# Patient Record
Sex: FEMALE | Race: WHITE | NOT HISPANIC OR LATINO | URBAN - METROPOLITAN AREA
[De-identification: names, ages, dates, MRNs, and addresses within clinical notes are randomized per-mention and may not be internally consistent; named-entity substitution may affect disease eponyms.]

---

## 2017-05-17 ENCOUNTER — OFFICE VISIT (OUTPATIENT)
Dept: OPTOMETRY | Facility: CLINIC | Age: 67
End: 2017-05-17

## 2017-05-17 DIAGNOSIS — H52.4 MYOPIA WITH ASTIGMATISM AND PRESBYOPIA, BILATERAL: ICD-10-CM

## 2017-05-17 DIAGNOSIS — H52.203 MYOPIA WITH ASTIGMATISM AND PRESBYOPIA, BILATERAL: ICD-10-CM

## 2017-05-17 DIAGNOSIS — H52.13 MYOPIA WITH ASTIGMATISM AND PRESBYOPIA, BILATERAL: ICD-10-CM

## 2017-05-17 DIAGNOSIS — H50.00 MONOCULAR ESOTROPIA: ICD-10-CM

## 2017-05-17 DIAGNOSIS — H55.01 CONGENITAL NYSTAGMUS: ICD-10-CM

## 2017-05-17 DIAGNOSIS — H25.13 NUCLEAR SENILE CATARACT, BILATERAL: ICD-10-CM

## 2017-05-17 DIAGNOSIS — E70.319 OCULAR ALBINISM (H): Primary | ICD-10-CM

## 2017-05-17 ASSESSMENT — TONOMETRY
IOP_METHOD: APPLANATION
OS_IOP_MMHG: 17
OD_IOP_MMHG: 17

## 2017-05-17 ASSESSMENT — REFRACTION_MANIFEST
OS_SPHERE: -2.00
OD_AXIS: 075
OD_SPHERE: -3.00
OS_CYLINDER: +0.75
OS_AXIS: 085
OD_CYLINDER: +0.75

## 2017-05-17 ASSESSMENT — REFRACTION_CURRENTRX
OD_CYLINDER: -0.75
OS_AXIS: 180
OD_AXIS: 180
OS_SPHERE: -1.50
OS_DIAMETER: 14.5
OD_SPHERE: -2.00
OD_AXIS: 170
OS_CYLINDER: -0.75
OD_DIAMETER: 14.5
OD_BASECURVE: 8.6
OS_BASECURVE: 8.6
OS_DIAMETER: 14.5
OS_BRAND: ACUVUE OASYS FOR ASTIGMATISM
OD_BRAND: ACUVUE OASYS FOR ASTIGMATISM
OS_BASECURVE: 8.6
OD_BASECURVE: 8.6
OS_CYLINDER: -0.75
OS_BRAND: ACUVUE OASYS FOR ASTIGMATISM
OD_CYLINDER: -0.75
OS_AXIS: 180
OS_SPHERE: -1.50
OD_DIAMETER: 14.5
OD_BRAND: ACUVUE OASYS FOR ASTIGMATISM
OD_SPHERE: -1.75

## 2017-05-17 ASSESSMENT — VISUAL ACUITY
OS_CC: 20/30-2
METHOD: SNELLEN - LINEAR
OD_CC: 20/40-2
CORRECTION_TYPE: CONTACTS

## 2017-05-17 ASSESSMENT — REFRACTION_WEARINGRX
OS_AXIS: 85
OS_AXIS: 085
OD_SPHERE: -3.00
OD_SPHERE: -2.75
OS_SPHERE: -2.00
OS_SPHERE: -2.00
OS_CYLINDER: +1.00
OS_ADD: +2.75
OS_CYLINDER: +0.75
OD_CYLINDER: +0.25
OS_ADD: +2.75
OD_ADD: +2.75
OD_AXIS: 074
OD_CYLINDER: +0.25
OD_ADD: +2.75
OD_AXIS: 74

## 2017-05-17 ASSESSMENT — SLIT LAMP EXAM - LIDS
COMMENTS: NORMAL
COMMENTS: NORMAL

## 2017-05-17 ASSESSMENT — CUP TO DISC RATIO
OD_RATIO: 0.4
OS_RATIO: 0.4

## 2017-05-17 ASSESSMENT — CONF VISUAL FIELD
OD_NORMAL: 1
OS_NORMAL: 1

## 2017-05-17 NOTE — MR AVS SNAPSHOT
After Visit Summary   5/17/2017    Ml Cagle    MRN: [id number]           Patient Information     Date Of Birth          1950        Visit Information        Provider Department      5/17/2017 4:00 PM Talia Isaac, [facility]        Today's Diagnoses     Ocular albinism (H)    -  1    Congenital nystagmus        Monocular esotropia        Nuclear senile cataract, bilateral        Myopia with astigmatism and presbyopia, bilateral           Follow-ups after your visit        Who to contact     Please call your clinic at [phone] to:    Ask questions about your health    Make or cancel appointments    Discuss your medicines    Learn about your test results    Speak to your doctor   If you have compliments or concerns about an experience at your clinic, or if you wish to file a complaint, please contact [facility] Patient Relations at [phone] or email us at [email or web address]         Additional Information About Your Visit        MyChart Information     Readbugt gives you secure access to your electronic health record. If you see a primary care provider, you can also send messages to your care team and make appointments. If you have questions, please call your primary care clinic.  If you do not have a primary care provider, please call [phone] and they will assist you.      CareParent is an electronic gateway that provides easy, online access to your medical records. With CareParent, you can request a clinic appointment, read your test results, renew a prescription or communicate with your care team.     To access your existing account, please contact your [facility] or call [phone] for assistance.        Care EveryWhere ID     This is your Care EveryWhere ID. This could be used by other organizations to access your [facility] medical records  [id number]         Blood Pressure from Last 3 Encounters:    03/04/14 99/61   07/02/13 98/68   02/05/13 101/60    Weight from Last 3 Encounters:   No data found for Wt              We Performed the Following     HC CONTACT LENS FITTING COSMETIC LVL 1 (94402.011)     REFRACTION [59219]          Today's Medication Changes          These changes are accurate as of: 5/17/17 11:59 PM.  If you have any questions, ask your nurse or doctor.               Stop taking these medicines if you haven't already. Please contact your care team if you have questions.     fluorouracil 5 % cream   Commonly known as:  EFUDEX   Stopped by:  Talia Isaac OD                    Primary Care Provider    Physician No Ref-Primary       No address on file        Thank you!     Thank you for choosing [facility]IN[facility]  for your care. Our goal is always to provide you with excellent care. Hearing back from our patients is one way we can continue to improve our services. Please take a few minutes to complete the written survey that you may receive in the mail after your visit with us. Thank you!             Your Updated Medication List - Protect others around you: Learn how to safely use, store and throw away your medicines at www.disposemymeds.org.          This list is accurate as of: 5/17/17 11:59 PM.  Always use your most recent med list.                   Brand Name Dispense Instructions for use    calcium 1500 MG Tabs      Take  by mouth.       CVS VIT D 5000 HIGH-POTENCY PO      Take  by mouth.       HERBALS          MULTI-VITAMIN DAILY PO      Take  by mouth.

## 2017-05-22 ASSESSMENT — VISUAL ACUITY: METHOD_MR: TRIAL FRAME REFRACTION

## 2017-05-22 NOTE — PROGRESS NOTES
A/P  1.) Ocular albinism OU  -Iris transillumination on exam   -BCVA 20/30 both eyes  -Stable OU    2.) Nystagmus  -Stable, monitor    3.) Esotropia, right eye  -H/o strab surgery right eye as a child  -She is concerned this is worsening  -No diplopia, but increased eyestrain towards end of the day  -Discussed options, she is interested in strab referral    4.) Cataracts OU  -Not visually significant at this time  -Continue to monitor    5.) Myopia/Astig/Presbyopia  -Updated CLRx and spec Rx  -Monitor     Refer to Dr. Lugo for strab eval, otherwise 1 year routine

## 2017-06-06 DIAGNOSIS — H53.2 DOUBLE VISION: Primary | ICD-10-CM

## 2017-06-07 ENCOUNTER — OFFICE VISIT (OUTPATIENT)
Dept: OPHTHALMOLOGY | Facility: CLINIC | Age: 67
End: 2017-06-07
Attending: OPHTHALMOLOGY
Payer: COMMERCIAL

## 2017-06-07 DIAGNOSIS — H50.05 ESOTROPIA, ALTERNATING: Primary | ICD-10-CM

## 2017-06-07 DIAGNOSIS — H53.2 DOUBLE VISION: ICD-10-CM

## 2017-06-07 PROCEDURE — 99215 OFFICE O/P EST HI 40 MIN: CPT | Mod: 25,ZF

## 2017-06-07 PROCEDURE — 92060 SENSORIMOTOR EXAMINATION: CPT | Mod: ZF | Performed by: OPHTHALMOLOGY

## 2017-06-07 ASSESSMENT — REFRACTION_WEARINGRX
OS_ADD: +2.75
OS_CYLINDER: +0.75
OD_CYLINDER: +0.25
OD_AXIS: 074
OS_AXIS: 85
OD_SPHERE: -2.75
OS_ADD: +2.75
OS_AXIS: 085
OD_SPHERE: -3.00
OS_SPHERE: -2.00
OD_AXIS: 74
OD_ADD: +2.75
OD_CYLINDER: +0.25
OS_SPHERE: -2.00
OD_ADD: +2.75
OS_CYLINDER: +1.00

## 2017-06-07 ASSESSMENT — VISUAL ACUITY
OD_CC: 20/40
OD_CC+: +2
CORRECTION_TYPE: GLASSES
OS_CC: 20/30
METHOD: SNELLEN - LINEAR
OS_CC+: +1

## 2017-06-07 ASSESSMENT — CONF VISUAL FIELD
OS_NORMAL: 1
OD_NORMAL: 1

## 2017-06-07 ASSESSMENT — SLIT LAMP EXAM - LIDS
COMMENTS: NORMAL
COMMENTS: NORMAL

## 2017-06-07 ASSESSMENT — TONOMETRY
OD_IOP_MMHG: 14
IOP_METHOD: TONOPEN
OS_IOP_MMHG: 12

## 2017-06-07 ASSESSMENT — CUP TO DISC RATIO
OS_RATIO: 0.4
OD_RATIO: 0.4

## 2017-06-07 ASSESSMENT — EXTERNAL EXAM - RIGHT EYE: OD_EXAM: NORMAL

## 2017-06-07 ASSESSMENT — EXTERNAL EXAM - LEFT EYE: OS_EXAM: LUL PTOSIS

## 2017-06-07 ASSESSMENT — MARGIN REFLEX DISTANCE
OD_MRD1: 4
OS_MRD1: 1-2

## 2017-06-07 NOTE — NURSING NOTE
Chief Complaints and History of Present Illnesses   Patient presents with     Neurologic Problem     strabismus     HPI    Symptoms:              Comments:  Ml is a 66 year old female with a history of:   1. Esotropia  - Feels that eyes are turning in more than in the past. She is unsure if she needs surgery, or if it is recommended.   - History of strabismus surgery at 4-5 years of age.   - Feels right eye turns in more than left.   - Born with deviation, per patient account.   - 3-6 siblings have both OCA and strabismus. Brother had strabismus surgery as well.     [employer] 8:06 AM June 7, 2017       2. Ocular albinism OU  3. Nystagmus  4. Cataracts OU  5. Myopia/Astig/Presbyopia: Glasses and contact lens prescription updated recently by Dr. Donnelly.

## 2017-06-07 NOTE — LETTER
2017    RE: Ml Cagle  : 1950  MRN: [id number]    Dear Dr. Isaac,    Thank you for referring your patient, Ml Cagle, to my neuro-ophthalmology clinic recently.  After a thorough neuro-ophthalmic history and examination, I came to the following conclusions:         1. Ocular albinism with congenital esotropia status post strabismus surgery at age 4 and prominent positive angle kappa leading to aesthetic appearance of small angle right exotropia at distance despite moderate angle right esotropia at distance on alternate cover testing:    - patient noticing occasional fatigue of eyes but given lack of binocularity I doubt this is strabismus related asthenopia usually reserved for patient's with fusion.  - Given aesthetically ( testing via Krimsky) her eyes appear virtually aligned, no indication for strabismus repair at this time.  - Positive angle kappa is common in ocular albinism patients (virtually all have it and this patient has a relative large positive angle kappa).    2. Left upper eyelid ptosis  - Appears levator dehiscence related  - gave patient card of Dr. Balderas in case she wishes to pursue eyelid surgery in the future.      3. Contact lens  - continue to follow-up with Dr. Isaac    4. Congenital nystagmus associated with ocular albinism  - small right head turn / small angle left gaze null point  - observe    - follow-up as needed with me         Again, thank you for trusting me with the care of your patient.  For further exam details, please feel free to contact our office for additional records.  If you wish to contact me regarding this patient please email me at [email or web address] or give my clinic a call to arrange a phone conversation.    Sincerely,    Bang Lugo MD  , Neuro-Ophthalmology and Adult Strabismus  Department of Ophthalmology and Visual Neurosciences  [facility]    DX: ocular albinism, positive angle kappa

## 2017-06-07 NOTE — PROGRESS NOTES
ASSESSMENT AND PLAN:         1. Ocular albinism with congenital esotropia status post strabismus surgery at age 4 and prominent positive angle kappa leading to aesthetic appearance of small angle right exotropia at distance despite moderate angle right esotropia at distance on alternate cover testing:    - patient noticing occasional fatigue of eyes but given lack of binocularity I doubt this is strabismus related asthenopia usually reserved for patient's with fusion.  - Given aesthetically ( testing via Krimsky) her eyes appear virtually aligned, no indication for strabismus repair at this time.  - Positive angle kappa is common in ocular albinism patients (virtually all have it and this patient has a relative large positive angle kappa).    2. Left upper eyelid ptosis  - Appears levator dehiscence related  - gave patient card of Dr. Balderas in case she wishes to pursue eyelid surgery in the future.      3. Contact lens  - continue to follow-up with Dr. Isaac    4. Congenital nystagmus associated with ocular albinism  - small right head turn / small angle left gaze null point  - observe    - follow-up as needed with me          Complete documentation of historical and exam elements from today's encounter can be found in the full encounter summary report (not reduplicated in this progress note).  I personally obtained the chief complaint(s) and history of present illness.  I confirmed and edited as necessary the review of systems, past medical/surgical history, family history, social history, and examination findings as documented by others; and I examined the patient myself.  I personally reviewed the relevant tests, images, and reports as documented above.  I formulated and edited as necessary the assessment and plan and discussed the findings and management plan with the patient and family   Bang Lugo MD

## 2017-06-07 NOTE — MR AVS SNAPSHOT
After Visit Summary   6/7/2017    Ml Cagle    MRN: [id number]           Patient Information     Date Of Birth          1950        Visit Information        Provider Department      6/7/2017 8:00 AM [facility]        Today's Diagnoses     Esotropia, alternating    -  1    Double vision           Follow-ups after your visit        Who to contact     Please call your clinic at [phone] to:    Ask questions about your health    Make or cancel appointments    Discuss your medicines    Learn about your test results    Speak to your doctor   If you have compliments or concerns about an experience at your clinic, or if you wish to file a complaint, please contact [facility] Patient Relations at [phone] or email us at [email or web address]         Additional Information About Your Visit        MyChart Information     The [employer] gives you secure access to your electronic health record. If you see a primary care provider, you can also send messages to your care team and make appointments. If you have questions, please call your primary care clinic.  If you do not have a primary care provider, please call [phone] and they will assist you.      Quick2LAUNCH is an electronic gateway that provides easy, online access to your medical records. With Quick2LAUNCH, you can request a clinic appointment, read your test results, renew a prescription or communicate with your care team.     To access your existing account, please contact your [facility] or call [phone] for assistance.        Care EveryWhere ID     This is your Care EveryWhere ID. This could be used by other organizations to access your [facility] medical records  [id number]         Blood Pressure from Last 3 Encounters:   03/04/14 99/61   07/02/13 98/68   02/05/13 101/60    Weight from Last 3 Encounters:   No data found for Wt              We  Performed the Following     IOP Measurement     Sensorimotor        Primary Care Provider    Physician No Ref-Primary       No address on file        Thank you!     Thank you for choosing [facility]  for your care. Our goal is always to provide you with excellent care. Hearing back from our patients is one way we can continue to improve our services. Please take a few minutes to complete the written survey that you may receive in the mail after your visit with us. Thank you!             Your Updated Medication List - Protect others around you: Learn how to safely use, store and throw away your medicines at www.disposemymeds.org.          This list is accurate as of: 6/7/17  9:09 AM.  Always use your most recent med list.                   Brand Name Dispense Instructions for use    ALEVE PO      Take by mouth as needed for inflammation       calcium 1500 MG Tabs      Take  by mouth.       CVS VIT D 5000 HIGH-POTENCY PO      Take  by mouth.       HERBALS          MULTI-VITAMIN DAILY PO      Take  by mouth.

## 2017-08-14 ENCOUNTER — ALLIED HEALTH/NURSE VISIT (OUTPATIENT)
Dept: NURSING | Facility: CLINIC | Age: 67
End: 2017-08-14
Payer: COMMERCIAL

## 2017-08-14 DIAGNOSIS — Z11.1 SCREENING EXAMINATION FOR PULMONARY TUBERCULOSIS: Primary | ICD-10-CM

## 2017-08-14 PROCEDURE — 86580 TB INTRADERMAL TEST: CPT

## 2017-08-14 PROCEDURE — 99207 ZZC NO CHARGE NURSE ONLY: CPT

## 2017-08-14 NOTE — MR AVS SNAPSHOT
After Visit Summary   8/14/2017    Ml Cagle    MRN: [id number]           Patient Information     Date Of Birth          1950        Visit Information        Provider Department      8/14/2017 11:00 AM HW MEDICAL ASSISTANT [facility]        Today's Diagnoses     Screening examination for pulmonary tuberculosis    -  1       Follow-ups after your visit        Your next 10 appointments already scheduled     Aug 16, 2017 11:30 AM CDT   LAB with HW RN/TRIAGE NURSE ONLY   [facility] ([facility]    [address] [place] [id number] [phone]           Patient must bring picture ID. Patient should be prepared to give a urine specimen  Please do not eat 10-12 hours before your appointment if you are coming in fasting for labs on lipids, cholesterol, or glucose (sugar). Pregnant women should follow their Care Team instructions. Water with medications is okay. Do not drink coffee or other fluids. If you have concerns about taking  your medications, please ask at office or if scheduling via [employer], send a message by clicking on Secure Messaging, Message Your Care Team.              Who to contact     If you have questions or need follow up information about today's clinic visit or your schedule please contact [facility] directly at [phone].  Normal or non-critical lab and imaging results will be communicated to you by SureVisithart, letter or phone within 4 business days after the clinic has received the results. If you do not hear from us within 7 days, please contact the clinic through [employer] or phone. If you have a critical or abnormal lab result, we will notify you by phone as soon as possible.  Submit refill requests through [employer] or call your pharmacy and they will forward the refill request to us. Please allow 3 business days for your refill to be completed.          Additional Information About Your Visit         Friendsurance Information     Friendsurance gives you secure access to your electronic health record. If you see a primary care provider, you can also send messages to your care team and make appointments. If you have questions, please call your primary care clinic.  If you do not have a primary care provider, please call [phone] and they will assist you.        Care EveryWhere ID     This is your Care EveryWhere ID. This could be used by other organizations to access your [place] medical records  [id number]         Blood Pressure from Last 3 Encounters:   03/04/14 99/61   07/02/13 98/68   02/05/13 101/60    Weight from Last 3 Encounters:   No data found for Wt              We Performed the Following     TB INTRADERMAL TEST        Primary Care Provider    Physician No Ref-Primary       No address on file        Equal Access to Services     ARACELI GEORGES : Hadii gurjit Rowe, waaxda luqadaha, qaybta kaalmada miguelina, alyce watkins . So [facility] [place] [phone].    ATENCIÓN: Si habla español, tiene a connors disposición servicios gratuitos de asistencia lingüística. Llame al [phone].    We comply with applicable federal civil rights laws and [place] laws. We do not discriminate on the basis of race, color, national origin, age, disability sex, sexual orientation or gender identity.            Thank you!     Thank you for choosing [facility]  for your care. Our goal is always to provide you with excellent care. Hearing back from our patients is one way we can continue to improve our services. Please take a few minutes to complete the written survey that you may receive in the mail after your visit with us. Thank you!             Your Updated Medication List - Protect others around you: Learn how to safely use, store and throw away your medicines at www.disposemymeds.org.          This list is accurate as of: 8/14/17 11:35 AM.  Always use your most recent med list.                    Brand Name Dispense Instructions for use Diagnosis    ALEVE PO      Take by mouth as needed for inflammation        calcium 1500 MG Tabs      Take  by mouth.        CVS VIT D 5000 HIGH-POTENCY PO      Take  by mouth.        HERBALS           MULTI-VITAMIN DAILY PO      Take  by mouth.

## 2017-08-16 DIAGNOSIS — Z11.1 SCREENING EXAMINATION FOR PULMONARY TUBERCULOSIS: Primary | ICD-10-CM

## 2017-08-16 LAB
PPDINDURATION: 0 MM (ref 0–5)
PPDREDNESS: 0 MM

## 2017-08-16 PROCEDURE — 99207 ZZC NO CHARGE NURSE ONLY: CPT

## 2017-08-16 NOTE — LETTER
[address] [id number] [phone]          8/16/2017          To Whom it May Concern:     Ml Cagle, female, 1950 has had a mantoux placed on 8/14/17.      Mantoux result is NEGATIVE:  Lab Results   Component Value Date    PPDREDNESS 0 08/16/2017    PPDINDURATIO 0 08/16/2017                 Sincerely,          Jaymie Shelton, JODYN, RN

## 2017-08-16 NOTE — NURSING NOTE
Patient walked into exam room.    Patient in clinic for mantoux reading.    Results negative.    Result letter completed and given to patient.  JODY CanadaN, RN

## 2018-02-22 ENCOUNTER — TELEPHONE (OUTPATIENT)
Dept: DERMATOLOGY | Facility: CLINIC | Age: 68
End: 2018-02-22

## 2018-03-01 ENCOUNTER — OFFICE VISIT (OUTPATIENT)
Dept: DERMATOLOGY | Facility: CLINIC | Age: 68
End: 2018-03-01
Payer: COMMERCIAL

## 2018-03-01 DIAGNOSIS — D48.5 NEOPLASM OF UNCERTAIN BEHAVIOR OF SKIN: Primary | ICD-10-CM

## 2018-03-01 DIAGNOSIS — L82.1 SK (SEBORRHEIC KERATOSIS): ICD-10-CM

## 2018-03-01 DIAGNOSIS — Z85.828 HISTORY OF NONMELANOMA SKIN CANCER: ICD-10-CM

## 2018-03-01 RX ORDER — LIDOCAINE HYDROCHLORIDE AND EPINEPHRINE 10; 10 MG/ML; UG/ML
3 INJECTION, SOLUTION INFILTRATION; PERINEURAL ONCE
Qty: 3 ML | Refills: 0 | OUTPATIENT
Start: 2018-03-01 | End: 2018-03-01

## 2018-03-01 ASSESSMENT — PAIN SCALES - GENERAL: PAINLEVEL: NO PAIN (1)

## 2018-03-01 NOTE — LETTER
3/1/2018       RE: Ml Cagle  [address]  [facility] [address]     Dear Colleague,    Thank you for referring your patient, Ml Cagle, to the [facility] DERMATOLOGY at [facility]. Please see a copy of my visit note below.    SUBJECTIVE:  Ml comes in for a general skin check.  She is a lady who lived for quite a while in [place] and had a lot of sun and as a result has had numerous skin cancers and has had Mohs surgery and other excisional  surgeries through the years.      She is healthy, but she comes in today for a complete skin check because of her past history.  She is concerned about a lesion on her upper chest and midline chest that has been present a few months.  It was seen apparently a few months ago and she was advised to simply watch this.  This has not gone away with the use of topical steroids, so this is of concern to her today.        OBJECTIVE:  On examination, we checked her face, neck, chest, back, arms, legs, breasts, back, buttocks.  We found on the upper chest the lesion of concern.  It was a 1 cm, irregularly surfaced, shiny lesion that suggested a basal cell carcinoma.  It could be a squamous cell.  It could be just an actinic keratosis.      She had numerous, very small seborrheic keratoses on her back, and as a courtesy, I treated a few of these with liquid nitrogen.      I saw no other atypical or abnormal moles on her face, especially around the central face.      ASSESSMENT:     1.  Probable basal cell, upper chest.     2.  A 1 cm seborrheic keratosis.     3.  History of numerous nonmelanoma skin cancers.        PLAN:  The lesion on the chest was anesthetized, shaved, removed and submitted.  We will call with the report.  If this is basal cell, we likely will refer her to  Dermatologic Surgery for further treatment.      MEDICATIONS:  Reviewed.        ALLERGIES:  Reviewed.      We will likely have Ml come back in 6 months.            Lidocaine 1-1:064489 % injection   1mL once for one use, starting 3/2/2018 ending 3/2/2018,  2mL disp, R-0, injection  Injected by Dr. Matthews        Pictures were placed in Pt's chart today for future reference.    Again, thank you for allowing me to participate in the care of your patient.      Sincerely,    JENN Matthews MD

## 2018-03-01 NOTE — PROGRESS NOTES
SUBJECTIVE:  Ml comes in for a general skin check.  She is a lady who lived for quite a while in [place] and had a lot of sun and as a result has had numerous skin cancers and has had Mohs surgery and other excisional  surgeries through the years.      She is healthy, but she comes in today for a complete skin check because of her past history.  She is concerned about a lesion on her upper chest and midline chest that has been present a few months.  It was seen apparently a few months ago and she was advised to simply watch this.  This has not gone away with the use of topical steroids, so this is of concern to her today.        OBJECTIVE:  On examination, we checked her face, neck, chest, back, arms, legs, breasts, back, buttocks.  We found on the upper chest the lesion of concern.  It was a 1 cm, irregularly surfaced, shiny lesion that suggested a basal cell carcinoma.  It could be a squamous cell.  It could be just an actinic keratosis.      She had numerous, very small seborrheic keratoses on her back, and as a courtesy, I treated a few of these with liquid nitrogen.      I saw no other atypical or abnormal moles on her face, especially around the central face.      ASSESSMENT:     1.  Probable basal cell, upper chest.     2.  A 1 cm seborrheic keratosis.     3.  History of numerous nonmelanoma skin cancers.        PLAN:  The lesion on the chest was anesthetized, shaved, removed and submitted.  We will call with the report.  If this is basal cell, we likely will refer her to  Dermatologic Surgery for further treatment.      MEDICATIONS:  Reviewed.        ALLERGIES:  Reviewed.      We will likely have Ml come back in 6 months.

## 2018-03-01 NOTE — NURSING NOTE
Chief Complaint   Patient presents with     Skin Check     Caroline is here today to have a skin cancer exam. She has a leion on the chest that she is concerned about.      Susannah Thomson, CMA

## 2018-03-01 NOTE — PATIENT INSTRUCTIONS
Wound Care After a Biopsy    What is a skin biopsy?  A skin biopsy allows the doctor to examine a very small piece of tissue under the microscope to determine the diagnosis and the best treatment for the skin condition. A local anesthetic (numbing medicine)  is injected with a very small needle into the skin area to be tested. A small piece of skin is taken from the area. Sometimes a suture (stitch) is used.     What are the risks of a skin biopsy?  I will experience scar, bleeding, swelling, pain, crusting and redness. I may experience incomplete removal or recurrence. Risks of this procedure are excessive bleeding, bruising, infection, nerve damage, numbness, thick (hypertrophic or keloidal) scar and non-diagnostic biopsy.    How should I care for my wound for the first 24 hours?    Keep the wound dry and covered for 24 hours    If it bleeds, hold direct pressure on the area for 15 minutes. If bleeding does not stop then go to the emergency room    Avoid strenuous exercise the first 1-2 days or as your doctor instructs you    How should I care for the wound after 24 hours?    After 24 hours, remove the bandage    You may bathe or shower as normal    If you had a scalp biopsy, you can shampoo as usual and can use shower water to clean the biopsy site daily    Clean the wound twice a day with gentle soap and water    Do not scrub, be gentle    Apply white petroleum/Vaseline after cleaning the wound with a cotton swab or a clean finger, and keep the site covered with a Bandaid /bandage. Bandages are not necessary with a scalp biopsy    If you are unable to cover the site with a Bandaid /bandage, re-apply ointment 2-3 times a day to keep the site moist. Moisture will help with healing    Avoid strenuous activity for first 1-2 days    Avoid lakes, rivers, pools, and oceans until the stitches are removed or the site is healed    How do I clean my wound?    Wash hands thoroughly with soap or use hand  before all  wound care    Clean the wound with gentle soap and water    Apply white petroleum/Vaseline  to wound after it is clean    Replace the Bandaid /bandage to keep the wound covered for the first few days or as instructed by your doctor    If you had a scalp biopsy, warm shower water to the area on a daily basis should suffice    What should I use to clean my wound?     Cotton-tipped applicators (Qtips )    White petroleum jelly (Vaseline ). Use a clean new container and use Q-tips to apply.    Bandaids   as needed    Gentle soap     How should I care for my wound long term?    Do not get your wound dirty    Keep up with wound care for one week or until the area is healed.    A small scab will form and fall off by itself when the area is completely healed. The area will be red and will become pink in color as it heals. Sun protection is very important for how your scar will turn out. Sunscreen with an SPF 30 or greater is recommended once the area is healed.    If you have stitches, stitches need to be removed in 14 days. You may return to our clinic for this or you may have it done locally at your doctor s office.    You should have some soreness but it should be mild and slowly go away over several days. Talk to your doctor about using tylenol for pain,    When should I call my doctor?  If you have increased:     Pain or swelling    Pus or drainage (clear or slightly yellow drainage is ok)    Temperature over 100F    Spreading redness or warmth around wound    When will I hear about my results?  The biopsy results can take 2-3 weeks to come back. The clinic will call you with the results, send you a [employer] message, or have you schedule a follow-up clinic or phone time to discuss the results. Contact our clinics if you do not hear from us in 3 weeks.     Who should I call with questions?    [facility]: [phone]     [facility]: [phone]    For  urgent needs outside of business hours call the [facility] at [phone] and ask for the dermatology resident on call  Cryotherapy    What is it?    Use of a very cold liquid, such as liquid nitrogen, to freeze and destroy abnormal skin cells that need to be removed    What should I expect?    Tenderness and redness    A small blister that might grow and fill with dark purple blood. There may be crusting.    More than one treatment may be needed if the lesions do not go away.    How do I care for the treated area?    Gently wash the area with your hands when bathing.    Use a thin layer of Vaseline to help with healing. You may use a Band-Aid.     The area should heal within 7-10 days and may leave behind a pink or lighter color.     Do not use an antibiotic or Neosporin ointment.     You may take acetaminophen (Tylenol) for pain.     Call your Doctor if you have:    Severe pain    Signs of infection (warmth, redness, cloudy yellow drainage, and or a bad smell)    Questions or concerns    Who should I call with questions?       [facility]: [phone]       [facility]: [phone]       For urgent needs outside of business hours call the [facility] at [phone]        and ask for the dermatology resident on call

## 2018-03-01 NOTE — MR AVS SNAPSHOT
After Visit Summary   3/1/2018    Ml Cagle    MRN: [id number]           Patient Information     Date Of Birth          1950        Visit Information        Provider Department      3/1/2018 2:30 PM JENN Matthews MD [facility] D[facility]atology        Care Instructions    Wound Care After a Biopsy    What is a skin biopsy?  A skin biopsy allows the doctor to examine a very small piece of tissue under the microscope to determine the diagnosis and the best treatment for the skin condition. A local anesthetic (numbing medicine)  is injected with a very small needle into the skin area to be tested. A small piece of skin is taken from the area. Sometimes a suture (stitch) is used.     What are the risks of a skin biopsy?  I will experience scar, bleeding, swelling, pain, crusting and redness. I may experience incomplete removal or recurrence. Risks of this procedure are excessive bleeding, bruising, infection, nerve damage, numbness, thick (hypertrophic or keloidal) scar and non-diagnostic biopsy.    How should I care for my wound for the first 24 hours?    Keep the wound dry and covered for 24 hours    If it bleeds, hold direct pressure on the area for 15 minutes. If bleeding does not stop then go to the emergency room    Avoid strenuous exercise the first 1-2 days or as your doctor instructs you    How should I care for the wound after 24 hours?    After 24 hours, remove the bandage    You may bathe or shower as normal    If you had a scalp biopsy, you can shampoo as usual and can use shower water to clean the biopsy site daily    Clean the wound twice a day with gentle soap and water    Do not scrub, be gentle    Apply white petroleum/Vaseline after cleaning the wound with a cotton swab or a clean finger, and keep the site covered with a Bandaid /bandage. Bandages are not necessary with a scalp biopsy    If you are unable to cover the site with a Bandaid /bandage, re-apply ointment 2-3 times a day  to keep the site moist. Moisture will help with healing    Avoid strenuous activity for first 1-2 days    Avoid lakes, rivers, pools, and oceans until the stitches are removed or the site is healed    How do I clean my wound?    Wash hands thoroughly with soap or use hand  before all wound care    Clean the wound with gentle soap and water    Apply white petroleum/Vaseline  to wound after it is clean    Replace the Bandaid /bandage to keep the wound covered for the first few days or as instructed by your doctor    If you had a scalp biopsy, warm shower water to the area on a daily basis should suffice    What should I use to clean my wound?     Cotton-tipped applicators (Qtips )    White petroleum jelly (Vaseline ). Use a clean new container and use Q-tips to apply.    Bandaids   as needed    Gentle soap     How should I care for my wound long term?    Do not get your wound dirty    Keep up with wound care for one week or until the area is healed.    A small scab will form and fall off by itself when the area is completely healed. The area will be red and will become pink in color as it heals. Sun protection is very important for how your scar will turn out. Sunscreen with an SPF 30 or greater is recommended once the area is healed.    If you have stitches, stitches need to be removed in 14 days. You may return to our clinic for this or you may have it done locally at your doctor s office.    You should have some soreness but it should be mild and slowly go away over several days. Talk to your doctor about using tylenol for pain,    When should I call my doctor?  If you have increased:     Pain or swelling    Pus or drainage (clear or slightly yellow drainage is ok)    Temperature over 100F    Spreading redness or warmth around wound    When will I hear about my results?  The biopsy results can take 2-3 weeks to come back. The clinic will call you with the results, send you a [employer] message, or have you  schedule a follow-up clinic or phone time to discuss the results. Contact our clinics if you do not hear from us in 3 weeks.     Who should I call with questions?    [facility]: [phone]     [facility]: [phone]    For urgent needs outside of business hours call the [facility] at [phone] and ask for the dermatology resident on call            Follow-ups after your visit        Who to contact     Please call your clinic at [phone] to:    Ask questions about your health    Make or cancel appointments    Discuss your medicines    Learn about your test results    Speak to your doctor            Additional Information About Your Visit        [employer] Information     [employer] gives you secure access to your electronic health record. If you see a primary care provider, you can also send messages to your care team and make appointments. If you have questions, please call your primary care clinic.  If you do not have a primary care provider, please call [phone] and they will assist you.      [employer] is an electronic gateway that provides easy, online access to your medical records. With [employer], you can request a clinic appointment, read your test results, renew a prescription or communicate with your care team.     To access your existing account, please contact your [facility] or call [phone] for assistance.        Care EveryWhere ID     This is your Care EveryWhere ID. This could be used by other organizations to access your [place] medical records  [id number]         Blood Pressure from Last 3 Encounters:   03/04/14 99/61   07/02/13 98/68   02/05/13 101/60    Weight from Last 3 Encounters:   No data found for Wt              Today, you had the following     No orders found for display       Primary Care Provider Fax #    Physician No Ref-Primary [phone]       No address on file         Equal Access to Services     [facility] : Hadii aad ku hadtamikaandrei Lorenjeri, wajacksonda luqyonatanha, qamagda tristandavidalyce rajput. So [facility] [phone].    ATENCIÓN: Si habla español, tiene a connors disposición servicios gratuitos de asistencia lingüística. Llame al [phone].    We comply with applicable federal civil rights laws and [place] laws. We do not discriminate on the basis of race, color, national origin, age, disability, sex, sexual orientation, or gender identity.            Thank you!     Thank you for choosing [facility]  for your care. Our goal is always to provide you with excellent care. Hearing back from our patients is one way we can continue to improve our services. Please take a few minutes to complete the written survey that you may receive in the mail after your visit with us. Thank you!             Your Updated Medication List - Protect others around you: Learn how to safely use, store and throw away your medicines at www.disposemymeds.org.          This list is accurate as of 3/1/18  3:23 PM.  Always use your most recent med list.                   Brand Name Dispense Instructions for use Diagnosis    ALEVE PO      Take by mouth as needed for inflammation        calcium 1500 MG Tabs      Take  by mouth.        [employer] VIT D 5000 HIGH-POTENCY PO      Take  by mouth.        HERBALS           MULTI-VITAMIN DAILY PO      Take  by mouth.

## 2018-03-02 ASSESSMENT — PAIN SCALES - GENERAL: PAINLEVEL: NO PAIN (0)

## 2018-03-02 NOTE — PROGRESS NOTES
Lidocaine 1-1:268577 % injection   1mL once for one use, starting 3/2/2018 ending 3/2/2018,  2mL disp, R-0, injection  Injected by Dr. Matthews        Pictures were placed in Pt's chart today for future reference.

## 2018-03-05 LAB — COPATH REPORT: NORMAL

## 2018-03-07 ENCOUNTER — TELEPHONE (OUTPATIENT)
Dept: DERMATOLOGY | Facility: CLINIC | Age: 68
End: 2018-03-07

## 2018-03-07 NOTE — TELEPHONE ENCOUNTER
Called patient to inform that Dr. Matthews referred her to resident continuity clinic.  Patient preferred not to schedule with a resident, and opted to be scheduled with Dr. Chavira on 3/27/18.    Najma Simms  Clinic Manager

## 2018-03-07 NOTE — TELEPHONE ENCOUNTER
Ml returning call. She is looking for her path results from 3/1/18. Pt informed that biopsy showed superficial basal cell skin cancer. We will refer her for resident CC excision. Pt informed that we will call her when we have an opening with Dr. Gregorio. Pt stated that wednesdays work best for her.     Pati Davila LPN

## 2018-03-26 ENCOUNTER — TELEPHONE (OUTPATIENT)
Dept: DERMATOLOGY | Facility: CLINIC | Age: 68
End: 2018-03-26

## 2018-03-26 NOTE — TELEPHONE ENCOUNTER
History of excisions (cysts, skin cancer) : Has had MOHS in the past    Immunosuppressive Medications: No    HIV or Hepatitis B or C : No    Any Bleeding disorders: No    Do you have a Pacemaker or Defibrillator: No     Artificial heart valve: No    Joint Replacement in the last 2 years: No     Do you typically take Prophylactic Antibiotics before seeing a dentist or having a procedure?: No    If yes, verify that patient has the antibiotic on hand and instruct to take one hour before their surgery appointment.    Verify the patients pharmacy and notify Dr. DILLARD regarding need for antibiotics.     Do you have any other health issues that we should know about? Swelling after surgery on finger    Do you take any Blood Thinners:No     Medication Allergies: See chart    Patient was reminded to take all medications as usual.      Please inform the patient of the following:    Inform the patient that the incision will be larger then the spot that needs to be removed. For the wound to close in a straight line an ellipse (football shape) needs to be removed. That tissue is sent for pathology. (1-2 weeks for results)   Local anesthetic (Lidocaine and epinephrine) is used to numb the area. Depending on the location of the spot physical limitation may be required.

## 2018-03-26 NOTE — TELEPHONE ENCOUNTER
History of excisions (cysts, skin cancer) :     Immunosuppressive Medications:  (if yes, which ones: )    HIV or Hepatitis B or C :     Any Bleeding disorders:     Do you have a Pacemaker or Defibrillator:  (year placed: )    Artificial heart valve:  (mechanical or porcine: )    Joint Replacement in the last 2 years:  Joint(s):  Year(s):     Do you typically take Prophylactic Antibiotics before seeing a dentist or having a procedure?:     If yes, verify that patient has the antibiotic on hand and instruct to take one hour before their surgery appointment.    Verify the patients pharmacy and notify Dr. DILLARD regarding need for antibiotics.     Do you have any other health issues that we should know about?     Do you take any Blood Thinners:    Medication Allergies:     Patient was reminded to take all medications as usual.      Please inform the patient of the following:    Inform the patient that the incision will be larger then the spot that needs to be removed. For the wound to close in a straight line an ellipse (football shape) needs to be removed. That tissue is sent for pathology. (1-2 weeks for results)   Local anesthetic (Lidocaine and epinephrine) is used to numb the area. Depending on the location of the spot physical limitation may be required.

## 2018-03-27 ENCOUNTER — OFFICE VISIT (OUTPATIENT)
Dept: DERMATOLOGY | Facility: CLINIC | Age: 68
End: 2018-03-27
Payer: COMMERCIAL

## 2018-03-27 DIAGNOSIS — C44.519 BCC (BASAL CELL CARCINOMA), TRUNK: Primary | ICD-10-CM

## 2018-03-27 ASSESSMENT — PAIN SCALES - GENERAL
PAINLEVEL: NO PAIN (0)
PAINLEVEL: NO PAIN (0)

## 2018-03-27 NOTE — LETTER
3/27/2018       RE: Ml Cagle  [address]  [place] [facility] [facility] [address]     Dear Colleague,    Thank you for referring your patient, Ml Cagle, to the [facility] DERMATOLOGY at [facility]. Please see a copy of my visit note below.    DERMATOLOGY EXCISION PROCEDURE NOTE        NAME OF PROCEDURE: Excision intermediate layered linear closure  Staff surgeon: Rosa Chavira MD, PhD  Resident: n/a  Scrub Nurse: Johanny    PRE-OPERATIVE DIAGNOSIS:  Basal Cell Carcinoma  POST-OPERATIVE DIAGNOSIS: Same   LOCATION: upper chest  FINAL EXCISION SIZE(DEFECT SIZE): 1x1.3 to 1.8x2.1 cm with margin  MARGIN: 0.4 cm  FINAL REPAIR LENGTH: 4.2 cm   ANESTHESIA: 6.0 ml lidocaine with epinephrine         INDICATIONS: This patient presented with a 1x1.3 cm Basal Cell Carcinoma. Excision was indicated. We discussed the principles of treatment and most likely complications including scarring, bleeding, infection, incomplete excision, wound dehiscence, pain, nerve damage, and recurrence. Informed consent was obtained and the patient underwent the procedure as follows:    PROCEDURE: The patient was taken to the operative suite. Time-out was performed.  The treatment area was anesthetized with 1% lidocaine with epinephrine. The area was prepped with Chlorhexidine and rinsed with sterile saline and draped with sterile towels. The lesion was delineated and excised down to subcutaneous fat in a fusiform manner. Hemostasis was obtained by electrocoagulation.     REPAIR: An intermediate layered linear closure was selected as the procedure which would maximally preserve both function and cosmesis.    After the excision of the tumor, the area was carefully  undermined. Hemostasis was obtained with pressure and scant electrocoagulation.  Closure was oriented so that the wound was in the patient's natural skin tension lines. The subcutaneous and dermal layers were then closed with 4.0 vicryl sutures. The epidermis  was then carefully approximated along the length of the wound using 4.0 prolene running intradermal sutures.     Estimated blood loss was less than 10 ml for all surgical sites. A sterile pressure dressing was applied and wound care instructions, with a written handout, were given. The [facility] was [facility] alert and ambulatory.    Follow-up in 2 weeks for suture removal.     Anatomic Pathology Results: pending    Clinical Follow-Up: 6 months [facility]    Staff Involved:  Staff Only    Rosa Whaley MD, PhD    Dermatology

## 2018-03-27 NOTE — PATIENT INSTRUCTIONS

## 2018-03-27 NOTE — PROGRESS NOTES
DERMATOLOGY EXCISION PROCEDURE NOTE        NAME OF PROCEDURE: Excision intermediate layered linear closure  Staff surgeon: Rosa Chavira MD, PhD  Resident: n/a  Scrub Nurse: Johanny    PRE-OPERATIVE DIAGNOSIS:  Basal Cell Carcinoma  POST-OPERATIVE DIAGNOSIS: Same   LOCATION: upper chest  FINAL EXCISION SIZE(DEFECT SIZE): 1x1.3 to 1.8x2.1 cm with margin  MARGIN: 0.4 cm  FINAL REPAIR LENGTH: 4.2 cm   ANESTHESIA: 6.0 ml lidocaine with epinephrine         INDICATIONS: This patient presented with a 1x1.3 cm Basal Cell Carcinoma. Excision was indicated. We discussed the principles of treatment and most likely complications including scarring, bleeding, infection, incomplete excision, wound dehiscence, pain, nerve damage, and recurrence. Informed consent was obtained and the patient underwent the procedure as follows:    PROCEDURE: The patient was taken to the operative suite. Time-out was performed.  The treatment area was anesthetized with 1% lidocaine with epinephrine. The area was prepped with Chlorhexidine and rinsed with sterile saline and draped with sterile towels. The lesion was delineated and excised down to subcutaneous fat in a fusiform manner. Hemostasis was obtained by electrocoagulation.     REPAIR: An intermediate layered linear closure was selected as the procedure which would maximally preserve both function and cosmesis.    After the excision of the tumor, the area was carefully  undermined. Hemostasis was obtained with pressure and scant electrocoagulation.  Closure was oriented so that the wound was in the patient's natural skin tension lines. The subcutaneous and dermal layers were then closed with 4.0 vicryl sutures. The epidermis was then carefully approximated along the length of the wound using 4.0 prolene running intradermal sutures.     Estimated blood loss was less than 10 ml for all surgical sites. A sterile pressure dressing was applied and wound care instructions, with a written handout,  were given. The patient was discharged from the [facility] alert and ambulatory.    Follow-up in 2 weeks for suture removal.     Anatomic Pathology Results: pending    Clinical Follow-Up: 6 months [facility]    Staff Involved:  Staff Only    Rosa Whaley MD, PhD    Dermatology

## 2018-03-27 NOTE — MR AVS SNAPSHOT
After Visit Summary   3/27/2018    Ml Cagle    MRN: [id number]           Patient Information     Date Of Birth          1950        Visit Information        Provider Department      3/27/2018 8:00 AM Rosa Whaley MD M [facility]        Today's Diagnoses     BCC (basal cell carcinoma), trunk    -  1      Care Instructions    Excision Wound Care Instructions  I will experience scar, altered skin color, bleeding, swelling, pain, crusting and redness. I may experience altered sensation. Risks are excessive bleeding, infection, muscle weakness, thick (hypertrophic or keloidal) scar, and recurrence,. A second procedure may be recommended to obtain the best cosmetic or functional result.  Possible complications of any surgical procedure are bleeding, infection, scarring, alteration in skin color and sensation, muscle weakness in the area, wound dehiscence or seperation, or recurrence of the lesion or disease. On occasion, after healing, a secondary procedure or revision may be recommended in order to obtain the best cosmetic or functional result.   After your surgery, a pressure bandage will be placed over the area that has sutures. This will help prevent bleeding. Please follow these instructions until you come back to clinic for suture removal on , as they will help you to prevent complications as your wound heals.  For the First 48 hours After Surgery:  1. Leave the pressure bandage on and keep it dry. If it should come loose, you may retape it, but do not take it off.  2. Relax and take it easy. Do not do any vigorous exercise, heavy lifting, or bending forward. This could cause the wound to bleed.  3. Post-operative pain is usually mild. You may take plain or extra strength Tylenol every 4 hours as needed (do not take more than 4,000mg in one day). Do not take any medicine that contains aspirin, ibuprofen or motrin unless you have been recommended these by a doctor.   Avoid alcohol and vitamin E as these may increase your tendency to bleed.  4. You may put an ice pack around the bandaged area for 20 minutes every 2-3 hours. This may help reduce swelling, bruising, and pain. Make sure the ice pack is waterproof so that the pressure bandage does not get wet.   5. You may see a small amount of drainage or blood on your pressure bandage. This is normal. However, if drainage or bleeding continues or saturates the bandage, you will need to apply firm pressure over the bandage with a washcloth for 15 minutes. If bleeding continues after applying pressure for 15 minutes then go to the nearest emergency room.  48 Hours After Surgery  Carefully remove the bandage and start daily wound care and dressing changes. You may also now shower and get the wound wet. Wash wound with a mild soap and water.  Use caution when washing the wound. Be gentle and do not let the forceful shower stream hit the wound directly.  PAT dry.  Daily Wound Care:  1. Wash wound with a mild soap and water.  Use caution when washing the wound, be gentle and do not let the forceful shower stream hit the wound directly.  2. PAT DRY.  3. Apply Vaseline (from a new container or tube) over the suture line with a Q-tip. It is very important to keep the wound continuously moist, as wounds heal best in a moist environment.  4.  Keep the site covered until sutures are removed, you can cover it with a Telfa (non-stick) dressing and tape or a band-aid.    5. If you are unable to keep wound covered, you must apply Vaseline every 2 - 3 hours (while awake) to ensure it is being kept moist for optimal healing. A dressing overnight is recommended to keep the area moist.   Call Us If:  1. You have pain that is not controlled with Tylenol.  2. You have signs or symptoms of an infection, such as: fever over 100 degrees F, redness, warmth, or foul-smelling or yellow/creamy drainage from the wound.  Who should I call with  questions?    [facility]: [phone]     [facility]: [phone]    For urgent needs outside of business hours call the [facility] at [phone] and ask for the dermatology resident on call              Follow-ups after your visit        Follow-up notes from your care team     Return in about 6 months (around 9/27/2018).      Your next 10 appointments already scheduled     Apr 09, 2018  4:00 PM CDT   Nurse Visit with  Dermatology Nurse   [facility] ([facility] [place])    [address] [address] [phone]              Who to contact     Please call your clinic at [phone] to:    Ask questions about your health    Make or cancel appointments    Discuss your medicines    Learn about your test results    Speak to your doctor            Additional Information About Your Visit        [employer] Information     [employer] gives you secure access to your electronic health record. If you see a primary care provider, you can also send messages to your care team and make appointments. If you have questions, please call your primary care clinic.  If you do not have a primary care provider, please call [phone] and they will assist you.      [employer] is an electronic gateway that provides easy, online access to your medical records. With [employer], you can request a clinic appointment, read your test results, renew a prescription or communicate with your care team.     To access your existing account, please contact your [facility] or call [phone] for assistance.        Care EveryWhere ID     This is your Care EveryWhere ID. This could be used by other organizations to access your [place] medical records  [id number]         Blood Pressure from Last 3 Encounters:   03/04/14 99/61   07/02/13 98/68   02/05/13 101/60    Weight from Last 3 Encounters:    No data found for Wt              We Performed the Following     51253 - EXC MALIG SKIN LESION TRUNK/ARM/LEG 2.1-3.0 CM     Dermatological path order and indications     Dermatological path order and indications     REPAIR INTERMED, WOUND TRUNK/ARM/LEG 2.6-7.5 CM        Primary Care Provider Fax #    Physician No Ref-Primary [phone]       No address on file        Equal Access to Services     [facility]: Hadii aad ku hadasho Soomaali, waaxda luqadaha, qaybta kaalmada adeegyada, waxay idiin hayaan adestephy thibodeaux lajosén . So [facility] [phone].    ATENCIÓN: Si habla espyasmine, tiene a connors disposición servicios gratuitos de asistencia lingüística. Llame al [phone].    We comply with applicable federal civil rights laws and [place] laws. We do not discriminate on the basis of race, color, national origin, age, disability, sex, sexual orientation, or gender identity.            Thank you!     Thank you for choosing [facility] DERMAT[facility]  for your care. Our goal is always to provide you with excellent care. Hearing back from our patients is one way we can continue to improve our services. Please take a few minutes to complete the written survey that you may receive in the mail after your visit with us. Thank you!             Your Updated Medication List - Protect others around you: Learn how to safely use, store and throw away your medicines at www.disposemymeds.org.          This list is accurate as of 3/27/18  9:17 AM.  Always use your most recent med list.                   Brand Name Dispense Instructions for use Diagnosis    ALEVE PO      Take by mouth as needed for inflammation        calcium 1500 MG Tabs      Take  by mouth.        CVS VIT D 5000 HIGH-POTENCY PO      Take  by mouth.        HERBALS           MULTI-VITAMIN DAILY PO      Take  by mouth.

## 2018-03-27 NOTE — NURSING NOTE
Dermatology Rooming Note    Ml Pereirabarbara's goals for this visit include:   Chief Complaint   Patient presents with     Derm Problem     Ml is here for an excision.      Johanny Sibley LPN

## 2018-03-29 LAB — COPATH REPORT: NORMAL

## 2018-04-05 ENCOUNTER — TELEPHONE (OUTPATIENT)
Dept: DERMATOLOGY | Facility: CLINIC | Age: 68
End: 2018-04-05

## 2018-04-05 NOTE — TELEPHONE ENCOUNTER
I called and left a message for patient. We need to reschedule her appt for tomorrow.     Pati Davila

## 2018-04-09 ENCOUNTER — ALLIED HEALTH/NURSE VISIT (OUTPATIENT)
Dept: DERMATOLOGY | Facility: CLINIC | Age: 68
End: 2018-04-09
Payer: COMMERCIAL

## 2018-04-09 DIAGNOSIS — Z48.02 ENCOUNTER FOR REMOVAL OF SUTURES: Primary | ICD-10-CM

## 2018-04-09 NOTE — NURSING NOTE
Dermatology Suture Removal Record  S: Ml presents to the clinic today for  removal of suture.  The patient has had the sutures and suture in place for 14 days.    There has been no history of infection or drainage.    O: 1 running suture are seen located on the upper chest.  The wound is healing well with no signs of infection.      A: Suture removal.    P:  All suture were easily removed today.  Routine wound care discussed.  The patient will follow up as needed.        Avril Tristan LPN

## 2018-04-09 NOTE — MR AVS SNAPSHOT
After Visit Summary   4/9/2018    Ml Cagle    MRN: [id number]           Patient Information     Date Of Birth          1950        Visit Information        Provider Department      4/9/2018 3:30 PM Nurse,  Dermatology [facility]        Today's Diagnoses     Encounter for removal of sutures    -  1       Follow-ups after your visit        Who to contact     Please call your clinic at [phone] to:    Ask questions about your health    Make or cancel appointments    Discuss your medicines    Learn about your test results    Speak to your doctor            Additional Information About Your Visit        [employer] Information     [employer] gives you secure access to your electronic health record. If you see a primary care provider, you can also send messages to your care team and make appointments. If you have questions, please call your primary care clinic.  If you do not have a primary care provider, please call [phone] and they will assist you.      [employer] is an electronic gateway that provides easy, online access to your medical records. With [employer], you can request a clinic appointment, read your test results, renew a prescription or communicate with your care team.     To access your existing account, please contact your [facility] or call [phone] for assistance.        Care EveryWhere ID     This is your Care EveryWhere ID. This could be used by other organizations to access your [place] medical records  [id number]         Blood Pressure from Last 3 Encounters:   03/04/14 99/61   07/02/13 98/68   02/05/13 101/60    Weight from Last 3 Encounters:   No data found for Wt              Today, you had the following     No orders found for display       Primary Care Provider Fax #    Physician No Ref-Primary [phone]       No address on file        Equal Access to Services     ARACELI GEORGES : ho Gill  gregorio kellerclara clarencealyce fierro gageclint moore. So [facility] [phone].    ATENCIÓN: Si fallon haas, tiene a connors disposición servicios gratuitos de asistencia lingüística. Llame al [phone].    We comply with applicable federal civil rights laws and [place] laws. We do not discriminate on the basis of race, color, national origin, age, disability, sex, sexual orientation, or gender identity.            Thank you!     Thank you for choosing [facility] DERMATOLOG[facility]  for your care. Our goal is always to provide you with excellent care. Hearing back from our patients is one way we can continue to improve our services. Please take a few minutes to complete the written survey that you may receive in the mail after your visit with us. Thank you!             Your Updated Medication List - Protect others around you: Learn how to safely use, store and throw away your medicines at www.disposemymeds.org.          This list is accurate as of 4/9/18  3:37 PM.  Always use your most recent med list.                   Brand Name Dispense Instructions for use Diagnosis    ALEVE PO      Take by mouth as needed for inflammation        calcium 1500 MG Tabs      Take  by mouth.        CVS VIT D 5000 HIGH-POTENCY PO      Take  by mouth.        HERBALS           MULTI-VITAMIN DAILY PO      Take  by mouth.

## 2018-06-04 ENCOUNTER — OFFICE VISIT (OUTPATIENT)
Dept: OPTOMETRY | Facility: CLINIC | Age: 68
End: 2018-06-04
Payer: COMMERCIAL

## 2018-06-04 DIAGNOSIS — H50.00 MONOCULAR ESOTROPIA: ICD-10-CM

## 2018-06-04 DIAGNOSIS — H25.13 NUCLEAR SENILE CATARACT, BILATERAL: ICD-10-CM

## 2018-06-04 DIAGNOSIS — H52.4 MYOPIA OF BOTH EYES WITH ASTIGMATISM AND PRESBYOPIA: ICD-10-CM

## 2018-06-04 DIAGNOSIS — H02.402 PTOSIS OF LEFT EYELID: ICD-10-CM

## 2018-06-04 DIAGNOSIS — H52.13 MYOPIA OF BOTH EYES WITH ASTIGMATISM AND PRESBYOPIA: ICD-10-CM

## 2018-06-04 DIAGNOSIS — H55.01 CONGENITAL NYSTAGMUS: ICD-10-CM

## 2018-06-04 DIAGNOSIS — H52.203 MYOPIA OF BOTH EYES WITH ASTIGMATISM AND PRESBYOPIA: ICD-10-CM

## 2018-06-04 DIAGNOSIS — E70.319 OCULAR ALBINISM (H): Primary | ICD-10-CM

## 2018-06-04 ASSESSMENT — REFRACTION_MANIFEST
OD_CYLINDER: +1.00
OD_SPHERE: -2.50
OS_SPHERE: -2.00
OS_CYLINDER: +1.00
OS_AXIS: 090
OD_AXIS: 080

## 2018-06-04 ASSESSMENT — VISUAL ACUITY
OD_CC: 20/50
OD_CC+: +2
OS_CC+: +2
METHOD: SNELLEN - LINEAR
OS_CC: 20/30
CORRECTION_TYPE: CONTACTS
OD_PH_CC: 20/40-2

## 2018-06-04 ASSESSMENT — REFRACTION_CURRENTRX
OD_BASECURVE: 8.6
OD_DIAMETER: 14.5
OD_SPHERE: -2.00
OS_SPHERE: -1.50
OS_BASECURVE: 8.6
OS_CYLINDER: -0.75
OD_BRAND: ACUVUE OASYS FOR ASTIGMATISM
OD_CYLINDER: -0.75
OD_AXIS: 170
OS_DIAMETER: 14.5
OS_AXIS: 180
OS_BRAND: ACUVUE OASYS FOR ASTIGMATISM

## 2018-06-04 ASSESSMENT — CUP TO DISC RATIO
OD_RATIO: 0.40
OS_RATIO: 0.40

## 2018-06-04 ASSESSMENT — CONF VISUAL FIELD
OD_NORMAL: 1
METHOD: COUNTING FINGERS
OS_NORMAL: 1

## 2018-06-04 ASSESSMENT — SLIT LAMP EXAM - LIDS
COMMENTS: NORMAL
COMMENTS: NORMAL

## 2018-06-04 ASSESSMENT — TONOMETRY
IOP_METHOD: ICARE
OD_IOP_MMHG: 16
OS_IOP_MMHG: 16

## 2018-06-04 ASSESSMENT — REFRACTION_WEARINGRX
OD_ADD: +2.75
OS_ADD: +2.75
OS_AXIS: 085
OS_CYLINDER: +0.75
OD_AXIS: 075
OS_SPHERE: -2.00
OD_SPHERE: -3.00
OD_CYLINDER: +0.75

## 2018-06-04 ASSESSMENT — EXTERNAL EXAM - RIGHT EYE: OD_EXAM: NORMAL

## 2018-06-04 ASSESSMENT — EXTERNAL EXAM - LEFT EYE: OS_EXAM: LUL PTOSIS

## 2018-06-04 NOTE — MR AVS SNAPSHOT
After Visit Summary   6/4/2018    Ml Cagle    MRN: [id number]           Patient Information     Date Of Birth          1950        Visit Information        Provider Department      6/4/2018 9:30 AM Talia Isaac OD [facility]        Care Instructions    Future Appointments  Date Time Provider Department Center   6/5/2019 8:30 AM Marlin Talia Chantelle, OD EYECL [facility]               Follow-ups after your visit        Who to contact     Please call your clinic at [phone] to:    Ask questions about your health    Make or cancel appointments    Discuss your medicines    Learn about your test results    Speak to your doctor            Additional Information About Your Visit        [employer] Information     [employer] gives you secure access to your electronic health record. If you see a primary care provider, you can also send messages to your care team and make appointments. If you have questions, please call your primary care clinic.  If you do not have a primary care provider, please call [phone] and they will assist you.      [employer] is an electronic gateway that provides easy, online access to your medical records. With [employer], you can request a clinic appointment, read your test results, renew a prescription or communicate with your care team.     To access your existing account, please contact your [facility] or call [phone] for assistance.        Care EveryWhere ID     This is your Care EveryWhere ID. This could be used by other organizations to access your [place] medical records  [id number]         Blood Pressure from Last 3 Encounters:   03/04/14 99/61   07/02/13 98/68   02/05/13 101/60    Weight from Last 3 Encounters:   No data found for Wt              Today, you had the following     No orders found for display       Primary Care Provider Fax #    Physician No Ref-Primary [phone]       No address on file        Equal  Access to Services     [facility]: Hadii aad ku hadtamikaandrei Andrewali, wajacksonda luqmarcelina, qamagda tristandavidalyce rajput. So [facility] [phone].    ATENCIÓN: Si habla español, tiene a connors disposición servicios gratuitos de asistencia lingüística. Llame al [phone].    We comply with applicable federal civil rights laws and [place] laws. We do not discriminate on the basis of race, color, national origin, age, disability, sex, sexual orientation, or gender identity.            Thank you!     Thank you for choosing EYE CLINIC  for your care. Our goal is always to provide you with excellent care. Hearing back from our patients is one way we can continue to improve our services. Please take a few minutes to complete the written survey that you may receive in the mail after your visit with us. Thank you!             Your Updated Medication List - Protect others around you: Learn how to safely use, store and throw away your medicines at www.disposemymeds.org.          This list is accurate as of 6/4/18  9:58 AM.  Always use your most recent med list.                   Brand Name Dispense Instructions for use Diagnosis    calcium 1500 MG Tabs      Take  by mouth.        CVS VIT D 5000 HIGH-POTENCY PO      Take  by mouth.        HERBALS           MULTI-VITAMIN DAILY PO      Take  by mouth.

## 2018-06-04 NOTE — PATIENT INSTRUCTIONS
Future Appointments  Date Time Provider Department Center   6/5/2019 8:30 AM Talia Isaac, OD EYECL [facility]

## 2018-06-05 ENCOUNTER — TELEPHONE (OUTPATIENT)
Dept: OPHTHALMOLOGY | Facility: CLINIC | Age: 68
End: 2018-06-05

## 2018-06-05 NOTE — TELEPHONE ENCOUNTER
Health Call Center    Phone Message    May a detailed message be left on voicemail: yes    Reason for Call: Other: Pt of Dr. Isaac, last seen 6/4 called in and stated she did not receive the Rx for the Progressive part of her Rx for her reading glasses, and went to purchase a pair of glasses and was told she needed the Progressive Rx for her reading glasses. Pt is unsure of what she should do next.      Action Taken: Message routed to:  [facility]

## 2018-06-05 NOTE — TELEPHONE ENCOUNTER
Note to Dr. elias to amend final Rx    Pt aware via voicemessage of process  Boogie Jewell RN 4:03 PM 06/05/18

## 2020-03-02 ENCOUNTER — HEALTH MAINTENANCE LETTER (OUTPATIENT)
Age: 70
End: 2020-03-02

## 2020-12-14 ENCOUNTER — HEALTH MAINTENANCE LETTER (OUTPATIENT)
Age: 70
End: 2020-12-14

## 2021-04-18 ENCOUNTER — HEALTH MAINTENANCE LETTER (OUTPATIENT)
Age: 71
End: 2021-04-18

## 2021-08-23 ENCOUNTER — OFFICE VISIT (OUTPATIENT)
Dept: OPTOMETRY | Facility: CLINIC | Age: 71
End: 2021-08-23
Payer: COMMERCIAL

## 2021-08-23 DIAGNOSIS — H52.13 MYOPIA OF BOTH EYES WITH ASTIGMATISM AND PRESBYOPIA: ICD-10-CM

## 2021-08-23 DIAGNOSIS — H02.402 PTOSIS OF EYELID, LEFT: ICD-10-CM

## 2021-08-23 DIAGNOSIS — H25.13 NUCLEAR AGE-RELATED CATARACT, BOTH EYES: ICD-10-CM

## 2021-08-23 DIAGNOSIS — E70.319 OCULAR ALBINISM (H): Primary | ICD-10-CM

## 2021-08-23 DIAGNOSIS — H52.203 MYOPIA OF BOTH EYES WITH ASTIGMATISM AND PRESBYOPIA: ICD-10-CM

## 2021-08-23 DIAGNOSIS — H52.4 MYOPIA OF BOTH EYES WITH ASTIGMATISM AND PRESBYOPIA: ICD-10-CM

## 2021-08-23 ASSESSMENT — CUP TO DISC RATIO
OD_RATIO: 0.50
OS_RATIO: 0.45

## 2021-08-23 ASSESSMENT — REFRACTION_MANIFEST
OS_AXIS: 085
OS_SPHERE: -2.00
OS_CYLINDER: +1.00
OD_SPHERE: -3.25
OD_CYLINDER: +1.25
OS_ADD: +2.75
OD_ADD: +2.75
OD_AXIS: 075

## 2021-08-23 ASSESSMENT — CONF VISUAL FIELD
OS_NORMAL: 1
OD_NORMAL: 1

## 2021-08-23 ASSESSMENT — REFRACTION_CURRENTRX
OS_AXIS: 180
OD_BRAND: ACUVUE OASYS FOR ASTIGMATISM
OS_CYLINDER: -0.75
OS_DIAMETER: 14.5
OD_SPHERE: -1.75
OS_BRAND: ACUVUE OASYS FOR ASTIGMATISM
OD_AXIS: 170
OD_CYLINDER: -0.75
OD_BASECURVE: 8.6
OD_DIAMETER: 14.5
OS_BASECURVE: 8.6
OS_SPHERE: -1.50

## 2021-08-23 ASSESSMENT — VISUAL ACUITY
METHOD: SNELLEN - LINEAR
CORRECTION_TYPE: GLASSES
OD_CC: 20/40+2

## 2021-08-23 ASSESSMENT — REFRACTION_WEARINGRX
OS_AXIS: 085
OD_CYLINDER: +0.75
OD_AXIS: 075
OD_SPHERE: -3.00
OD_ADD: +2.75
OS_SPHERE: -2.00
OS_CYLINDER: +0.75
OS_ADD: +2.75

## 2021-08-23 ASSESSMENT — EXTERNAL EXAM - LEFT EYE: OS_EXAM: LUL PTOSIS

## 2021-08-23 ASSESSMENT — TONOMETRY
IOP_METHOD: ICARE
OS_IOP_MMHG: 18
OD_IOP_MMHG: 15

## 2021-08-23 ASSESSMENT — SLIT LAMP EXAM - LIDS
COMMENTS: NORMAL
COMMENTS: NORMAL

## 2021-08-23 ASSESSMENT — EXTERNAL EXAM - RIGHT EYE: OD_EXAM: NORMAL

## 2021-08-23 NOTE — PROGRESS NOTES
A/P  1.) Ocular albinism with nystagmus OU  -Iris transillumination on exam   -BCVA 20/30 both eyes  -Stable OU    2.) Esotropia, right eye  -H/o strab surgery right eye as a child  -No diplopia, some intermittent eyestrain  -Saw Dr. Lugo, no surgery recommended at this time  -Doing well overall, no binocular vision    3.) Cataracts OU  -Minimal visual significance. No change in BCVA, but symptomatic for reading/low light  -Continue to monitor at this time    4.) Ptosis OS  -Stable overall  -Mildly bothersome, consider oculoplastics referral at any time    5.) Myopia/Astig/Presbyopia  -Updated spec Rx, minimal change. Okay to continue in CL's  -Monitor     Monitor 1-2 years routine, sooner prn    I have confirmed the patient's CC, HPI and reviewed Past Medical History, Past Surgical History, Social History, Family History, Problem List, Medication List and agree with Tech note.     Talia Isaac, OD FAAO

## 2021-10-02 ENCOUNTER — HEALTH MAINTENANCE LETTER (OUTPATIENT)
Age: 71
End: 2021-10-02

## 2022-03-19 ENCOUNTER — HEALTH MAINTENANCE LETTER (OUTPATIENT)
Age: 72
End: 2022-03-19

## 2022-05-14 ENCOUNTER — HEALTH MAINTENANCE LETTER (OUTPATIENT)
Age: 72
End: 2022-05-14

## 2022-09-04 ENCOUNTER — HEALTH MAINTENANCE LETTER (OUTPATIENT)
Age: 72
End: 2022-09-04

## 2023-01-15 ENCOUNTER — HEALTH MAINTENANCE LETTER (OUTPATIENT)
Age: 73
End: 2023-01-15

## 2023-05-27 ENCOUNTER — MYC MEDICAL ADVICE (OUTPATIENT)
Dept: OPTOMETRY | Facility: CLINIC | Age: 73
End: 2023-05-27

## 2023-06-28 ENCOUNTER — OFFICE VISIT (OUTPATIENT)
Dept: OPTOMETRY | Facility: CLINIC | Age: 73
End: 2023-06-28
Payer: COMMERCIAL

## 2023-06-28 DIAGNOSIS — H52.4 MYOPIA OF BOTH EYES WITH ASTIGMATISM AND PRESBYOPIA: ICD-10-CM

## 2023-06-28 DIAGNOSIS — E70.319 OCULAR ALBINISM (H): ICD-10-CM

## 2023-06-28 DIAGNOSIS — H25.13 NUCLEAR AGE-RELATED CATARACT, BOTH EYES: Primary | ICD-10-CM

## 2023-06-28 DIAGNOSIS — H04.123 DRY EYES: ICD-10-CM

## 2023-06-28 DIAGNOSIS — H52.13 MYOPIA OF BOTH EYES WITH ASTIGMATISM AND PRESBYOPIA: ICD-10-CM

## 2023-06-28 DIAGNOSIS — H02.402 PTOSIS, LEFT EYELID: ICD-10-CM

## 2023-06-28 DIAGNOSIS — H52.203 MYOPIA OF BOTH EYES WITH ASTIGMATISM AND PRESBYOPIA: ICD-10-CM

## 2023-06-28 RX ORDER — CARBOXYMETHYLCELLULOSE SODIUM 5 MG/ML
1 SOLUTION/ DROPS OPHTHALMIC 3 TIMES DAILY PRN
Qty: 70 EACH | Refills: 4 | Status: SHIPPED | OUTPATIENT
Start: 2023-06-28

## 2023-06-28 ASSESSMENT — REFRACTION_WEARINGRX
OS_SPHERE: -2.00
OD_CYLINDER: +1.25
OS_ADD: +2.75
SPECS_TYPE: PAL
OD_AXIS: 075
OD_SPHERE: -3.25
OS_AXIS: 085
OS_CYLINDER: +1.00
OD_ADD: +2.75

## 2023-06-28 ASSESSMENT — CONF VISUAL FIELD
OS_SUPERIOR_TEMPORAL_RESTRICTION: 0
OS_INFERIOR_NASAL_RESTRICTION: 0
OS_SUPERIOR_NASAL_RESTRICTION: 0
OD_INFERIOR_NASAL_RESTRICTION: 0
OD_INFERIOR_TEMPORAL_RESTRICTION: 0
OD_SUPERIOR_NASAL_RESTRICTION: 0
OD_SUPERIOR_TEMPORAL_RESTRICTION: 0
OD_NORMAL: 1
OS_INFERIOR_TEMPORAL_RESTRICTION: 0
OS_NORMAL: 1

## 2023-06-28 ASSESSMENT — REFRACTION_MANIFEST
OD_CYLINDER: +1.25
OD_SPHERE: -3.50
OS_AXIS: 095
OD_ADD: +2.75
OD_AXIS: 070
OS_SPHERE: -2.00
OS_CYLINDER: +0.50
OS_ADD: +2.75

## 2023-06-28 ASSESSMENT — REFRACTION_CURRENTRX
OS_BASECURVE: 8.6
OS_CYLINDER: -0.75
OD_DIAMETER: 14.5
OD_AXIS: 170
OS_SPHERE: -1.50
OD_BASECURVE: 8.6
OS_DIAMETER: 14.5
OD_SPHERE: -1.75
OS_BRAND: ACUVUE OASYS FOR ASTIGMATISM
OD_CYLINDER: -0.75
OS_AXIS: 180
OD_BRAND: ACUVUE OASYS FOR ASTIGMATISM

## 2023-06-28 ASSESSMENT — VISUAL ACUITY
METHOD: SNELLEN - LINEAR
OD_CC: 20/40+
OD_CC: 20/50
OS_CC: 20/20-
OS_CC+: -2
OD_CC+: -2
OS_CC: 20/40
CORRECTION_TYPE: GLASSES

## 2023-06-28 ASSESSMENT — CUP TO DISC RATIO
OD_RATIO: 0.65
OS_RATIO: 0.50

## 2023-06-28 ASSESSMENT — TONOMETRY
OD_IOP_MMHG: 17
IOP_METHOD: ICARE
OS_IOP_MMHG: 17

## 2023-06-28 ASSESSMENT — SLIT LAMP EXAM - LIDS
COMMENTS: PTOSIS
COMMENTS: NORMAL

## 2023-06-28 ASSESSMENT — EXTERNAL EXAM - LEFT EYE: OS_EXAM: NORMAL

## 2023-06-28 ASSESSMENT — EXTERNAL EXAM - RIGHT EYE: OD_EXAM: NORMAL

## 2023-06-28 NOTE — PROGRESS NOTES
A/P  1.) Ocular albinism with nystagmus OU  -Iris transillumination on exam   -BCVA 20/40 right, 20/25+ left  -Longstanding, stable    2.) Esotropia, right eye  -H/o strab surgery right eye as a child  -No diplopia, some intermittent eyestrain  -Saw Dr. Lugo, no surgery recommended at this time  -Doing well overall, no binocular vision    3.) Cataracts OU  -Progressed from last exam but still functioning well  -Reviewed with pt including likely need for CE/IOL in the future  -Continue to monitor at this time    4.) Ptosis OS  -Stable overall  -Mildly bothersome, consider oculoplastics referral at any time    5.) Myopia/Astig/Presbyopia  -Updated spec Rx, minimal change. Okay to continue in CL's  -Monitor     Monitor 1-2 years routine, sooner prn    I have confirmed the patient's CC, HPI and reviewed Past Medical History, Past Surgical History, Social History, Family History, Problem List, Medication List and agree with Tech note.     Talia Isaac, JARVIS FAAO

## 2023-06-28 NOTE — PATIENT INSTRUCTIONS
Artificial tears: (Water-like consistency. Can be used during the daytime)  -Refresh Plus  -Refresh Optive  -Refresh Relieva  -Systane Ultra  -Systane Complete  -Systane Hydration  -Biotrue Hydration Boost  (Notes: Anything in a bottle has preservatives and can be used up to 4x/day. Preservative free vials can be used as much as necessary)

## 2023-09-30 ENCOUNTER — HEALTH MAINTENANCE LETTER (OUTPATIENT)
Age: 73
End: 2023-09-30

## 2024-04-27 ENCOUNTER — HEALTH MAINTENANCE LETTER (OUTPATIENT)
Age: 74
End: 2024-04-27

## 2024-11-23 ENCOUNTER — HEALTH MAINTENANCE LETTER (OUTPATIENT)
Age: 74
End: 2024-11-23

## 2025-02-12 ENCOUNTER — OFFICE VISIT (OUTPATIENT)
Dept: OPTOMETRY | Facility: CLINIC | Age: 75
End: 2025-02-12
Payer: COMMERCIAL

## 2025-02-12 DIAGNOSIS — H52.229 MYOPIA WITH REGULAR ASTIGMATISM AND PRESBYOPIA: ICD-10-CM

## 2025-02-12 DIAGNOSIS — H25.813 MIXED TYPE AGE-RELATED CATARACT, BOTH EYES: ICD-10-CM

## 2025-02-12 DIAGNOSIS — H35.363 DRUSEN OF MACULA OF BOTH EYES: ICD-10-CM

## 2025-02-12 DIAGNOSIS — H52.4 MYOPIA WITH REGULAR ASTIGMATISM AND PRESBYOPIA: ICD-10-CM

## 2025-02-12 DIAGNOSIS — H47.393 OPTIC NERVE CUPPING OF BOTH EYES: ICD-10-CM

## 2025-02-12 DIAGNOSIS — E70.319 OCULAR ALBINISM: Primary | ICD-10-CM

## 2025-02-12 DIAGNOSIS — Z83.518 FAMILY HISTORY OF MACULAR DEGENERATION: ICD-10-CM

## 2025-02-12 DIAGNOSIS — H52.10 MYOPIA WITH REGULAR ASTIGMATISM AND PRESBYOPIA: ICD-10-CM

## 2025-02-12 DIAGNOSIS — H55.00 NYSTAGMUS: ICD-10-CM

## 2025-02-12 ASSESSMENT — REFRACTION_WEARINGRX
OD_ADD: +2.75
OD_SPHERE: -3.50
OD_AXIS: 070
OS_ADD: +2.75
OS_AXIS: 095
OS_CYLINDER: +0.50
OD_CYLINDER: +1.25
OS_SPHERE: -2.00

## 2025-02-12 ASSESSMENT — REFRACTION_MANIFEST
OS_AXIS: 125
OD_SPHERE: -4.00
OS_CYLINDER: +0.50
OD_CYLINDER: +1.25
OS_SPHERE: -2.00
OD_AXIS: 075

## 2025-02-12 ASSESSMENT — REFRACTION_CURRENTRX
OD_BRAND: ACUVUE OASYS FOR ASTIGMATISM
OD_CYLINDER: -0.75
OD_BASECURVE: 8.6
OS_AXIS: 180
OS_BASECURVE: 8.6
OD_DIAMETER: 14.5
OD_SPHERE: -2.00
OS_BRAND: ACUVUE OASYS FOR ASTIGMATISM
OD_AXIS: 160
OS_SPHERE: -1.50
OS_DIAMETER: 14.5
OS_CYLINDER: -0.75

## 2025-02-12 ASSESSMENT — TONOMETRY
OD_IOP_MMHG: 19
OS_IOP_MMHG: 15
IOP_METHOD: ICARE

## 2025-02-12 ASSESSMENT — VISUAL ACUITY
CORRECTION_TYPE: GLASSES
OD_CC: 20/70-2+1
METHOD: SNELLEN - LINEAR
OS_CC: 20/50+2

## 2025-02-12 ASSESSMENT — EXTERNAL EXAM - RIGHT EYE: OD_EXAM: NORMAL

## 2025-02-12 ASSESSMENT — REFRACTION
OS_SPHERE: -1.75
OS_CYLINDER: +0.25
OD_SPHERE: -3.25
OD_AXIS: 049
OS_AXIS: 100
OD_CYLINDER: +1.25

## 2025-02-12 ASSESSMENT — SLIT LAMP EXAM - LIDS
COMMENTS: NORMAL
COMMENTS: PTOSIS

## 2025-02-12 ASSESSMENT — CONF VISUAL FIELD
OS_INFERIOR_TEMPORAL_RESTRICTION: 0
OD_NORMAL: 1
OS_SUPERIOR_TEMPORAL_RESTRICTION: 0
OS_SUPERIOR_NASAL_RESTRICTION: 0
OD_SUPERIOR_NASAL_RESTRICTION: 0
OS_INFERIOR_NASAL_RESTRICTION: 0
OD_INFERIOR_NASAL_RESTRICTION: 0
OD_INFERIOR_TEMPORAL_RESTRICTION: 0
OD_SUPERIOR_TEMPORAL_RESTRICTION: 0
OS_NORMAL: 1

## 2025-02-12 ASSESSMENT — CUP TO DISC RATIO
OD_RATIO: 0.65
OS_RATIO: 0.50

## 2025-02-12 ASSESSMENT — EXTERNAL EXAM - LEFT EYE: OS_EXAM: NORMAL

## 2025-02-12 NOTE — PATIENT INSTRUCTIONS
START AREDS 2 Eye Vitamins (Preservision or similar) for to help prevent/delay any signs of macular degeneration. We recommend you take this daily.     ---------------------------------    Artificial tears: (Water-like consistency. Can be used during the daytime)  -Refresh Plus  -Refresh Optive  -Refresh Relieva  -Systane Ultra  -Systane Complete  -Systane Hydration  -Biotrue Hydration Boost  (Notes: Anything in a bottle has preservatives and can be used up to 4x/day. Preservative free vials can be used as much as necessary)    Gel drops: (Thicker consistency, may blur vision slightly. Can be used during the day or at night)  -Refresh Celluvisc  -Refresh Liquigel  -Refresh Optive Gel Drops  -Systane Gel Drops  -Blink Gel Drops    Ointments: (Very thick, these moisturize the best but can blur vision. Best used right before bedtime)  -Refresh PM  -Systane Nighttime  -Genteal Gel

## 2025-02-12 NOTE — PROGRESS NOTES
A/P  1.) Ocular albinism with nystagmus OU  -Iris transillumination on exam   -Baseline BCVA was 20/40 right, 20/25+ left  -Longstanding, stable    2.) Cataracts OU  -Progressed from last exam, now BCVA limited at 20/60 right, 20/40 left  -Symptomatic for nighttime driving/glare  -Reviewed with pt including recommendation for CE/IOL. She would like to plan for this fall and will schedule with cataract surgeon for eval then    3.) Esotropia, right eye  -H/o strab surgery right eye as a child  -No diplopia, notes she is not binocular and suppresses right eye mainly  -Saw Dr. Lugo, no surgery recommended at this time    4.) Macular drusen OU  -Family Hx AMD (sister, brother)  -Start AREDS 2 formulation at this time  -Monitor annually with mac OCT    5.) Ptosis OS  -Stable, monitor    6.) Myopia/Astig/Presbyopia  -Updated spec Rx, improves about 1 line  -Very rare CL wear    RTC this fall for CE/IOL eval. If deferring would rec monitoring annually at this time    I have confirmed the patient's CC, HPI and reviewed Past Medical History, Past Surgical History, Social History, Family History, Problem List, Medication List and agree with Tech note.     Talia Isaac, JARVIS GAITANO BIMALS

## 2025-06-18 ENCOUNTER — TELEPHONE (OUTPATIENT)
Dept: OPHTHALMOLOGY | Facility: CLINIC | Age: 75
End: 2025-06-18
Payer: COMMERCIAL

## 2025-06-18 ENCOUNTER — OFFICE VISIT (OUTPATIENT)
Dept: OPHTHALMOLOGY | Facility: CLINIC | Age: 75
End: 2025-06-18
Attending: OPHTHALMOLOGY
Payer: COMMERCIAL

## 2025-06-18 DIAGNOSIS — H25.813 COMBINED FORM OF AGE-RELATED CATARACT, BOTH EYES: Primary | ICD-10-CM

## 2025-06-18 DIAGNOSIS — H55.09 ROTARY NYSTAGMUS: ICD-10-CM

## 2025-06-18 DIAGNOSIS — H35.3132 INTERMEDIATE STAGE NONEXUDATIVE AGE-RELATED MACULAR DEGENERATION OF BOTH EYES: ICD-10-CM

## 2025-06-18 PROCEDURE — G0463 HOSPITAL OUTPT CLINIC VISIT: HCPCS | Performed by: OPHTHALMOLOGY

## 2025-06-18 PROCEDURE — 76519 ECHO EXAM OF EYE: CPT | Performed by: OPHTHALMOLOGY

## 2025-06-18 PROCEDURE — 92025 CPTRIZED CORNEAL TOPOGRAPHY: CPT | Performed by: OPHTHALMOLOGY

## 2025-06-18 RX ORDER — CHOLECALCIFEROL (VITAMIN D3) 25 MCG
CAPSULE ORAL
COMMUNITY

## 2025-06-18 ASSESSMENT — TONOMETRY
OD_IOP_MMHG: 14
OS_IOP_MMHG: 15
IOP_METHOD: TONOPEN

## 2025-06-18 ASSESSMENT — REFRACTION_WEARINGRX
OD_CYLINDER: +1.25
OD_SPHERE: -3.50
OD_ADD: +2.75
OS_CYLINDER: +0.50
OD_AXIS: 070
OS_SPHERE: -2.00
OS_ADD: +2.75
OS_AXIS: 095

## 2025-06-18 ASSESSMENT — VISUAL ACUITY
OD_CC: 20/80
OS_CC+: +2
METHOD: SNELLEN - LINEAR
OD_PH_CC: 20/70
CORRECTION_TYPE: GLASSES
OS_CC: 20/60
OD_CC+: -1
OD_PH_CC+: -2

## 2025-06-18 ASSESSMENT — EXTERNAL EXAM - LEFT EYE: OS_EXAM: NORMAL

## 2025-06-18 ASSESSMENT — CUP TO DISC RATIO
OS_RATIO: 0.50
OD_RATIO: 0.65

## 2025-06-18 ASSESSMENT — SLIT LAMP EXAM - LIDS
COMMENTS: PTOSIS
COMMENTS: NORMAL

## 2025-06-18 ASSESSMENT — EXTERNAL EXAM - RIGHT EYE: OD_EXAM: NORMAL

## 2025-06-18 NOTE — TELEPHONE ENCOUNTER
Spoke with patient in clinic to schedule surgery with Dr Vallejo    Spoke with: Caroline (patient)    Date(s) of Surgery: 9/22/25 & 11/3/25 (Dr Vallejo ok'd the surgery gap)    Patient aware of approximate arrival time: Yes      Tissue: Not Applicable Ordered: Not Applicable     Location of surgery: [facility]     Pre-Op H&P: Primary Care Clinic at [facility]      Informed patient that they need to call to schedule pre-op H&P within 30 days of surgery date: Yes    Post-Op Appt Dates: 9/23 at 0900, 9/30 at 0900, 11/4 at 0900, and 11/18 at 0845     Discussed with patient pre-op RN will call 2-3 days prior to surgery with arrival time and instructions:  Yes       Standard Surgery Packet Sent: Yes 06/18/25  via Received in clinic by Meredith       Additional Information Sent in Packet: Post-op Appointment Itinerary    Informed patient that they will need an adult  to bring patient home from surgery: Yes  : Daniela (daughter)         Additional Comments:        All patients questions were answered and was instructed to review surgical packet and call back [phone] with any questions or concerns.       Meredith Salinas on 6/18/2025 at 4:32 PM     show

## 2025-06-18 NOTE — PROGRESS NOTES
HPI       Consult For    In both eyes.  Context:  night driving.  Since onset it is gradually worsening.  Associated symptoms include haloes, a need for brighter lights and dryness.  Negative for glare and eye pain.  Treatments tried include glasses.  Pain was noted as 0/10. Additional comments: Cataract surgery referred by Dr. Isaac             Comments    She states that her vision has seemed stable in both eyes since her last eye exam.  She notices some halos around lights.  She is starting to avoid night driving.    PARUL Herring 2:15 PM  June 18, 2025                  Last edited by Maureen Painting COT on 6/18/2025  2:18 PM.          Review of systems for the eyes was negative other than the pertinent positives/negatives listed in the HPI.      Assessment & Plan      Ml Cagle is a 74 year old female with the following diagnoses:   1. Combined form of age-related cataract, both eyes    2. Intermediate stage nonexudative age-related macular degeneration of both eyes    3. Rotary nystagmus         Referral from Dr. Isaac for cataract evaluation  H/O albinism with congenital nystagmus.  Best corrected visual acuity 20/40; 20/25 historically  Now 20/60 and 20/40 with recently diagnosis age related macular degeneration and worsening cataract     Cataract, both eyes  Visually significant  Risks, benefits and alternatives to cataract extraction/IOL implantation discussed; consent obtained.  Will schedule surgery today  Guarded vision potential reviewed in the setting of bilateral age related macular degeneration, nystagmus and irreg cylinder  Goal -0.25 both eyes     Special equipment/needs:    Anesthesia:MAC with block  Dilation:Good  Iris expansion:Not needed  Pseudoexfoliation: No pseudoexfoliation  Trypan Blue: No  Dex/NSAID            Attending Physician Attestation:  Complete documentation of historical and exam elements from today's encounter can be found in the full encounter summary report  (not reduplicated in this progress note).  I personally obtained the chief complaint(s) and history of present illness.  I confirmed and edited as necessary the review of systems, past medical/surgical history, family history, social history, and examination findings as documented by others; and I examined the patient myself.  I personally reviewed the relevant tests, images, and reports as documented above.  I formulated and edited as necessary the assessment and plan and discussed the findings and management plan with the patient and family. . - Selvin Vallejo MD